# Patient Record
Sex: FEMALE | Employment: FULL TIME | URBAN - METROPOLITAN AREA
[De-identification: names, ages, dates, MRNs, and addresses within clinical notes are randomized per-mention and may not be internally consistent; named-entity substitution may affect disease eponyms.]

---

## 2019-07-24 ENCOUNTER — APPOINTMENT (OUTPATIENT)
Dept: GENERAL RADIOLOGY | Age: 44
End: 2019-07-24
Attending: PHYSICIAN ASSISTANT
Payer: MEDICAID

## 2019-07-24 ENCOUNTER — APPOINTMENT (OUTPATIENT)
Dept: CT IMAGING | Age: 44
End: 2019-07-24
Attending: PHYSICIAN ASSISTANT
Payer: MEDICAID

## 2019-07-24 ENCOUNTER — HOSPITAL ENCOUNTER (EMERGENCY)
Age: 44
Discharge: HOME | End: 2019-07-24
Attending: EMERGENCY MEDICINE | Admitting: EMERGENCY MEDICINE
Payer: MEDICAID

## 2019-07-24 VITALS
DIASTOLIC BLOOD PRESSURE: 78 MMHG | HEART RATE: 88 BPM | BODY MASS INDEX: 39.56 KG/M2 | TEMPERATURE: 98 F | RESPIRATION RATE: 16 BRPM | WEIGHT: 215 LBS | HEIGHT: 62 IN | OXYGEN SATURATION: 100 % | SYSTOLIC BLOOD PRESSURE: 122 MMHG

## 2019-07-24 DIAGNOSIS — I26.99 PE (PULMONARY THROMBOEMBOLISM) (HCC): Primary | ICD-10-CM

## 2019-07-24 LAB
ALBUMIN SERPL-MCNC: 3.8 G/DL (ref 3.4–5)
ALBUMIN/GLOB SERPL: 0.9 {RATIO} (ref 0.8–1.7)
ALP SERPL-CCNC: 103 U/L (ref 45–117)
ALT SERPL-CCNC: 23 U/L (ref 13–56)
ANION GAP SERPL CALC-SCNC: 7 MMOL/L (ref 3–18)
APPEARANCE UR: ABNORMAL
APTT PPP: 29.1 SEC (ref 23–36.4)
AST SERPL-CCNC: 16 U/L (ref 10–38)
ATRIAL RATE: 79 BPM
BACTERIA URNS QL MICRO: ABNORMAL /HPF
BASOPHILS # BLD: 0 K/UL (ref 0–0.1)
BASOPHILS NFR BLD: 0 % (ref 0–2)
BILIRUB SERPL-MCNC: 0.7 MG/DL (ref 0.2–1)
BILIRUB UR QL: NEGATIVE
BUN SERPL-MCNC: 7 MG/DL (ref 7–18)
BUN/CREAT SERPL: 9 (ref 12–20)
CALCIUM SERPL-MCNC: 8.9 MG/DL (ref 8.5–10.1)
CALCULATED P AXIS, ECG09: 49 DEGREES
CALCULATED T AXIS, ECG11: 8 DEGREES
CHLORIDE SERPL-SCNC: 101 MMOL/L (ref 100–111)
CO2 SERPL-SCNC: 27 MMOL/L (ref 21–32)
COLOR UR: ABNORMAL
CREAT SERPL-MCNC: 0.8 MG/DL (ref 0.6–1.3)
D DIMER PPP FEU-MCNC: 2.41 UG/ML(FEU)
DIAGNOSIS, 93000: NORMAL
DIFFERENTIAL METHOD BLD: ABNORMAL
EOSINOPHIL # BLD: 0.1 K/UL (ref 0–0.4)
EOSINOPHIL NFR BLD: 1 % (ref 0–5)
EPITH CASTS URNS QL MICRO: ABNORMAL /LPF (ref 0–5)
ERYTHROCYTE [DISTWIDTH] IN BLOOD BY AUTOMATED COUNT: 16.5 % (ref 11.6–14.5)
GLOBULIN SER CALC-MCNC: 4.4 G/DL (ref 2–4)
GLUCOSE SERPL-MCNC: 126 MG/DL (ref 74–99)
GLUCOSE UR STRIP.AUTO-MCNC: NEGATIVE MG/DL
HCG SERPL QL: NEGATIVE
HCG UR QL: NEGATIVE
HCT VFR BLD AUTO: 40.9 % (ref 35–45)
HGB BLD-MCNC: 13.2 G/DL (ref 12–16)
HGB UR QL STRIP: NEGATIVE
INR PPP: 1.1 (ref 0.8–1.2)
KETONES UR QL STRIP.AUTO: ABNORMAL MG/DL
LACTATE BLD-SCNC: 1.07 MMOL/L (ref 0.4–2)
LEUKOCYTE ESTERASE UR QL STRIP.AUTO: NEGATIVE
LIPASE SERPL-CCNC: 84 U/L (ref 73–393)
LYMPHOCYTES # BLD: 3.9 K/UL (ref 0.9–3.6)
LYMPHOCYTES NFR BLD: 29 % (ref 21–52)
MCH RBC QN AUTO: 26.2 PG (ref 24–34)
MCHC RBC AUTO-ENTMCNC: 32.3 G/DL (ref 31–37)
MCV RBC AUTO: 81.2 FL (ref 74–97)
MONOCYTES # BLD: 0.8 K/UL (ref 0.05–1.2)
MONOCYTES NFR BLD: 6 % (ref 3–10)
MUCOUS THREADS URNS QL MICRO: ABNORMAL /LPF
NEUTS SEG # BLD: 8.3 K/UL (ref 1.8–8)
NEUTS SEG NFR BLD: 64 % (ref 40–73)
NITRITE UR QL STRIP.AUTO: NEGATIVE
P-R INTERVAL, ECG05: 168 MS
PH UR STRIP: 5.5 [PH] (ref 5–8)
PLATELET # BLD AUTO: 277 K/UL (ref 135–420)
PMV BLD AUTO: 9.3 FL (ref 9.2–11.8)
POTASSIUM SERPL-SCNC: 4 MMOL/L (ref 3.5–5.5)
PROT SERPL-MCNC: 8.2 G/DL (ref 6.4–8.2)
PROT UR STRIP-MCNC: 30 MG/DL
PROTHROMBIN TIME: 13.7 SEC (ref 11.5–15.2)
Q-T INTERVAL, ECG07: 372 MS
QRS DURATION, ECG06: 78 MS
QTC CALCULATION (BEZET), ECG08: 426 MS
RBC # BLD AUTO: 5.04 M/UL (ref 4.2–5.3)
RBC #/AREA URNS HPF: ABNORMAL /HPF (ref 0–5)
SODIUM SERPL-SCNC: 135 MMOL/L (ref 136–145)
SP GR UR REFRACTOMETRY: 1.03 (ref 1–1.03)
TROPONIN I SERPL-MCNC: <0.02 NG/ML (ref 0–0.04)
UROBILINOGEN UR QL STRIP.AUTO: 1 EU/DL (ref 0.2–1)
VENTRICULAR RATE, ECG03: 79 BPM
WBC # BLD AUTO: 13.2 K/UL (ref 4.6–13.2)
WBC URNS QL MICRO: ABNORMAL /HPF (ref 0–4)

## 2019-07-24 PROCEDURE — 71046 X-RAY EXAM CHEST 2 VIEWS: CPT

## 2019-07-24 PROCEDURE — 83605 ASSAY OF LACTIC ACID: CPT

## 2019-07-24 PROCEDURE — 87040 BLOOD CULTURE FOR BACTERIA: CPT

## 2019-07-24 PROCEDURE — 96375 TX/PRO/DX INJ NEW DRUG ADDON: CPT

## 2019-07-24 PROCEDURE — 83690 ASSAY OF LIPASE: CPT

## 2019-07-24 PROCEDURE — 81025 URINE PREGNANCY TEST: CPT

## 2019-07-24 PROCEDURE — 74011250636 HC RX REV CODE- 250/636: Performed by: PHYSICIAN ASSISTANT

## 2019-07-24 PROCEDURE — 96374 THER/PROPH/DIAG INJ IV PUSH: CPT

## 2019-07-24 PROCEDURE — 71045 X-RAY EXAM CHEST 1 VIEW: CPT

## 2019-07-24 PROCEDURE — 93005 ELECTROCARDIOGRAM TRACING: CPT

## 2019-07-24 PROCEDURE — 85730 THROMBOPLASTIN TIME PARTIAL: CPT

## 2019-07-24 PROCEDURE — 85025 COMPLETE CBC W/AUTO DIFF WBC: CPT

## 2019-07-24 PROCEDURE — 71275 CT ANGIOGRAPHY CHEST: CPT

## 2019-07-24 PROCEDURE — 84703 CHORIONIC GONADOTROPIN ASSAY: CPT

## 2019-07-24 PROCEDURE — 74011636320 HC RX REV CODE- 636/320: Performed by: EMERGENCY MEDICINE

## 2019-07-24 PROCEDURE — 84484 ASSAY OF TROPONIN QUANT: CPT

## 2019-07-24 PROCEDURE — 85379 FIBRIN DEGRADATION QUANT: CPT

## 2019-07-24 PROCEDURE — 81001 URINALYSIS AUTO W/SCOPE: CPT

## 2019-07-24 PROCEDURE — 99285 EMERGENCY DEPT VISIT HI MDM: CPT

## 2019-07-24 PROCEDURE — 74176 CT ABD & PELVIS W/O CONTRAST: CPT

## 2019-07-24 PROCEDURE — 80053 COMPREHEN METABOLIC PANEL: CPT

## 2019-07-24 PROCEDURE — 85610 PROTHROMBIN TIME: CPT

## 2019-07-24 PROCEDURE — 96361 HYDRATE IV INFUSION ADD-ON: CPT

## 2019-07-24 RX ORDER — KETOROLAC TROMETHAMINE 30 MG/ML
15 INJECTION, SOLUTION INTRAMUSCULAR; INTRAVENOUS
Status: COMPLETED | OUTPATIENT
Start: 2019-07-24 | End: 2019-07-24

## 2019-07-24 RX ORDER — HYDROCODONE BITARTRATE AND ACETAMINOPHEN 5; 325 MG/1; MG/1
1 TABLET ORAL
Qty: 18 TAB | Refills: 0 | Status: SHIPPED | OUTPATIENT
Start: 2019-07-24 | End: 2019-07-27

## 2019-07-24 RX ORDER — AZITHROMYCIN 250 MG/1
TABLET, FILM COATED ORAL
Qty: 6 TAB | Refills: 0 | Status: SHIPPED | OUTPATIENT
Start: 2019-07-24 | End: 2019-07-29

## 2019-07-24 RX ORDER — HEPARIN SODIUM 10000 [USP'U]/100ML
20-36 INJECTION, SOLUTION INTRAVENOUS
Status: DISCONTINUED | OUTPATIENT
Start: 2019-07-24 | End: 2019-07-24

## 2019-07-24 RX ORDER — ONDANSETRON 2 MG/ML
4 INJECTION INTRAMUSCULAR; INTRAVENOUS
Status: COMPLETED | OUTPATIENT
Start: 2019-07-24 | End: 2019-07-24

## 2019-07-24 RX ORDER — MORPHINE SULFATE 4 MG/ML
4 INJECTION INTRAVENOUS
Status: COMPLETED | OUTPATIENT
Start: 2019-07-24 | End: 2019-07-24

## 2019-07-24 RX ORDER — SODIUM CHLORIDE 0.9 % (FLUSH) 0.9 %
5-10 SYRINGE (ML) INJECTION AS NEEDED
Status: DISCONTINUED | OUTPATIENT
Start: 2019-07-24 | End: 2019-07-24 | Stop reason: HOSPADM

## 2019-07-24 RX ORDER — HEPARIN SODIUM 1000 [USP'U]/ML
80 INJECTION, SOLUTION INTRAVENOUS; SUBCUTANEOUS ONCE
Status: DISCONTINUED | OUTPATIENT
Start: 2019-07-24 | End: 2019-07-24

## 2019-07-24 RX ADMIN — ONDANSETRON 4 MG: 2 INJECTION INTRAMUSCULAR; INTRAVENOUS at 09:30

## 2019-07-24 RX ADMIN — SODIUM CHLORIDE 925 ML: 900 INJECTION, SOLUTION INTRAVENOUS at 12:55

## 2019-07-24 RX ADMIN — IOPAMIDOL 80 ML: 612 INJECTION, SOLUTION INTRAVENOUS at 13:05

## 2019-07-24 RX ADMIN — SODIUM CHLORIDE 1000 ML: 900 INJECTION, SOLUTION INTRAVENOUS at 11:55

## 2019-07-24 RX ADMIN — KETOROLAC TROMETHAMINE 15 MG: 30 INJECTION, SOLUTION INTRAMUSCULAR at 09:31

## 2019-07-24 RX ADMIN — MORPHINE SULFATE 4 MG: 4 INJECTION INTRAVENOUS at 09:31

## 2019-07-24 NOTE — ED PROVIDER NOTES
EMERGENCY DEPARTMENT HISTORY AND PHYSICAL EXAM    Date: 7/24/2019  Patient Name: Yara Bloom    History of Presenting Illness     Chief Complaint   Patient presents with    Flank Pain         History Provided By: Patient     Chief Complaint: flank pain, hematuria, one episode of blood in the sputum  Duration: 2 days  Timing: Worsening   Location: Right flank  Quality: Cramping  Severity: Severe  Modifying Factors: None  Associated Symptoms: none       Additional History (Context): Yara Bloom is a 41 y.o. female with no medical history who presents today for 2-day history worsening right flank pain. Patient reports it came on gradually and has worsened. Denies history of kidney stones or this in the past.  Reports hematuria. Denies any pelvic pain, vaginal bleeding or discharge. Patient reports she went to urgent care and was told she had blood in her urine. Patient reports she was sitting in the car this morning trying to go to work when she coughed and had a small amount of blood in her sputum. Patient denies being ill prior to this. Denies any fevers. Denies any nausea or vomiting. Denies any constipation or diarrhea. Denies any abdominal surgical history. Denies taking any medicines daily.       PCP: None    Current Facility-Administered Medications   Medication Dose Route Frequency Provider Last Rate Last Dose    sodium chloride (NS) flush 5-10 mL  5-10 mL IntraVENous PRN Rosalie Bowden        heparin (porcine) 1,000 unit/mL injection 7,800 Units  80 Units/kg IntraVENous ONCE [facility] Eric[facility] Feroz Mcconnell        heparin 25,000 units in D5W 250 ml infusion  20-36 Units/kg/hr IntraVENous TITRATE Rosalie Bowden         Current Outpatient Medications   Medication Sig Dispense Refill    apixaban (ELIQUIS) 5 mg (74 tabs) starter pack Take 10 mg (two 5 mg tablets) by mouth twice a day for 7 days   Followed by 5 mg (one 5 mg tablet) by mouth twice a day 1 Dose Pack 0    azithromycin (ZITHROMAX Z-ALYSON) 250 mg tablet Take two tablets on day one and one tablet on days 2-5 6 Tab 0    HYDROcodone-acetaminophen (NORCO) 5-325 mg per tablet Take 1 Tab by mouth every four (4) hours as needed for Pain. 12 Tab 0       Past History     Past Medical History:  History reviewed. No pertinent past medical history. Past Surgical History:  Past Surgical History:   Procedure Laterality Date    HX GYN  tubal ligation       Family History:  History reviewed. No pertinent family history. Social History:  Social History     Tobacco Use    Smoking status: Current Every Day Smoker     Packs/day: 0.25   Substance Use Topics    Alcohol use: No    Drug use: Not on file       Allergies: Allergies   Allergen Reactions    Penicillins Rash         Review of Systems   Review of Systems   Constitutional: Positive for chills. Negative for fever. HENT: Negative for congestion, rhinorrhea and sore throat. Respiratory: Negative for cough and shortness of breath. Cardiovascular: Negative for chest pain. Gastrointestinal: Negative for abdominal pain, blood in stool, constipation, diarrhea, nausea and vomiting. Genitourinary: Positive for flank pain and hematuria. Negative for dysuria, frequency, pelvic pain and vaginal discharge. Musculoskeletal: Negative for back pain and myalgias. Skin: Negative for rash and wound. Neurological: Negative for dizziness and headaches. All other systems reviewed and are negative. All Other Systems Negative  Physical Exam     Vitals:    07/24/19 0923   BP: (!) 129/92   Pulse: 99   Resp: 18   Temp: 98.9 °F (37.2 °C)   SpO2: 100%   Weight: 97.5 kg (215 lb)   Height: 5' 2\" (1.575 m)     Physical Exam   Constitutional: She is oriented to person, place, and time. She appears well-developed and well-nourished. She appears distressed. Passing around the room      HENT:   Head: Normocephalic and atraumatic. Eyes: Conjunctivae are normal.   Neck: Normal range of motion. Neck supple. Cardiovascular: Normal rate, regular rhythm and normal heart sounds. Pulmonary/Chest: Effort normal and breath sounds normal. No respiratory distress. She has no decreased breath sounds. She has no wheezes. She has no rhonchi. She has no rales. She exhibits no tenderness. Abdominal: Soft. Bowel sounds are normal. She exhibits no distension. There is no tenderness. There is CVA tenderness. There is no rebound and no guarding. Right CVA tenderness      Musculoskeletal: She exhibits no edema or deformity. Neurological: She is alert and oriented to person, place, and time. She has normal reflexes. Skin: Skin is warm and dry. She is not diaphoretic. Psychiatric: She has a normal mood and affect. Nursing note and vitals reviewed. Diagnostic Study Results     Labs -     Recent Results (from the past 12 hour(s))   CBC WITH AUTOMATED DIFF    Collection Time: 07/24/19  9:20 AM   Result Value Ref Range    WBC 13.2 4.6 - 13.2 K/uL    RBC 5.04 4.20 - 5.30 M/uL    HGB 13.2 12.0 - 16.0 g/dL    HCT 40.9 35.0 - 45.0 %    MCV 81.2 74.0 - 97.0 FL    MCH 26.2 24.0 - 34.0 PG    MCHC 32.3 31.0 - 37.0 g/dL    RDW 16.5 (H) 11.6 - 14.5 %    PLATELET 935 203 - 588 K/uL    MPV 9.3 9.2 - 11.8 FL    NEUTROPHILS 64 40 - 73 %    LYMPHOCYTES 29 21 - 52 %    MONOCYTES 6 3 - 10 %    EOSINOPHILS 1 0 - 5 %    BASOPHILS 0 0 - 2 %    ABS. NEUTROPHILS 8.3 (H) 1.8 - 8.0 K/UL    ABS. LYMPHOCYTES 3.9 (H) 0.9 - 3.6 K/UL    ABS. MONOCYTES 0.8 0.05 - 1.2 K/UL    ABS. EOSINOPHILS 0.1 0.0 - 0.4 K/UL    ABS.  BASOPHILS 0.0 0.0 - 0.1 K/UL    DF AUTOMATED     METABOLIC PANEL, COMPREHENSIVE    Collection Time: 07/24/19  9:20 AM   Result Value Ref Range    Sodium 135 (L) 136 - 145 mmol/L    Potassium 4.0 3.5 - 5.5 mmol/L    Chloride 101 100 - 111 mmol/L    CO2 27 21 - 32 mmol/L    Anion gap 7 3.0 - 18 mmol/L    Glucose 126 (H) 74 - 99 mg/dL    BUN 7 7.0 - 18 MG/DL    Creatinine 0.80 0.6 - 1.3 MG/DL    BUN/Creatinine ratio 9 (L) 12 - 20      GFR est AA >60 >60 ml/min/1.73m2    GFR est non-AA >60 >60 ml/min/1.73m2    Calcium 8.9 8.5 - 10.1 MG/DL    Bilirubin, total 0.7 0.2 - 1.0 MG/DL    ALT (SGPT) 23 13 - 56 U/L    AST (SGOT) 16 10 - 38 U/L    Alk.  phosphatase 103 45 - 117 U/L    Protein, total 8.2 6.4 - 8.2 g/dL    Albumin 3.8 3.4 - 5.0 g/dL    Globulin 4.4 (H) 2.0 - 4.0 g/dL    A-G Ratio 0.9 0.8 - 1.7     LIPASE    Collection Time: 07/24/19  9:20 AM   Result Value Ref Range    Lipase 84 73 - 393 U/L   HCG QL SERUM    Collection Time: 07/24/19  9:20 AM   Result Value Ref Range    HCG, Ql. NEGATIVE  NEG     D DIMER    Collection Time: 07/24/19  9:20 AM   Result Value Ref Range    D DIMER 2.41 (H) <0.46 ug/ml(FEU)   URINALYSIS W/ RFLX MICROSCOPIC    Collection Time: 07/24/19 10:50 AM   Result Value Ref Range    Color DARK YELLOW      Appearance TURBID      Specific gravity 1.028 1.005 - 1.030      pH (UA) 5.5 5.0 - 8.0      Protein 30 (A) NEG mg/dL    Glucose NEGATIVE  NEG mg/dL    Ketone TRACE (A) NEG mg/dL    Bilirubin NEGATIVE  NEG      Blood NEGATIVE  NEG      Urobilinogen 1.0 0.2 - 1.0 EU/dL    Nitrites NEGATIVE  NEG      Leukocyte Esterase NEGATIVE  NEG     HCG URINE, QL    Collection Time: 07/24/19 10:50 AM   Result Value Ref Range    HCG urine, QL NEGATIVE  NEG     URINE MICROSCOPIC ONLY    Collection Time: 07/24/19 10:50 AM   Result Value Ref Range    WBC 0 to 3 0 - 4 /hpf    RBC 2 to 5 0 - 5 /hpf    Epithelial cells 2+ 0 - 5 /lpf    Bacteria 3+ (A) NEG /hpf    Mucus 2+ (A) NEG /lpf   EKG, 12 LEAD, INITIAL    Collection Time: 07/24/19 11:09 AM   Result Value Ref Range    Ventricular Rate 79 BPM    Atrial Rate 79 BPM    P-R Interval 168 ms    QRS Duration 78 ms    Q-T Interval 372 ms    QTC Calculation (Bezet) 426 ms    Calculated P Axis 49 degrees    Calculated T Axis 8 degrees    Diagnosis       Normal sinus rhythm  Normal ECG  No previous ECGs available  Confirmed by Zakiya Berry (Veena) on 7/24/2019 3:44:01 PM     POC LACTIC ACID    Collection Time: 07/24/19 12:17 PM   Result Value Ref Range    Lactic Acid (POC) 1.07 0.40 - 2.00 mmol/L   PROTHROMBIN TIME + INR    Collection Time: 07/24/19  1:30 PM   Result Value Ref Range    Prothrombin time 13.7 11.5 - 15.2 sec    INR 1.1 0.8 - 1.2     PTT    Collection Time: 07/24/19  1:30 PM   Result Value Ref Range    aPTT 29.1 23.0 - 36.4 SEC   TROPONIN I    Collection Time: 07/24/19  2:00 PM   Result Value Ref Range    Troponin-I, QT <0.02 0.0 - 0.045 NG/ML       Radiologic Studies -   CTA CHEST W OR W WO CONT   Final Result   IMPRESSION:      1. Acute segmental pulmonary embolism is present within the right lower lung. Associated area of patchy airspace opacity is present which could be related to   evolving pulmonary infarct or pneumonia. No right heart strain. Critical findings discussed with Dr. Medina See at 13:22 on 7/24/2019      CRITICAL RESULT:      Acute segmental pulmonary embolism is present within the right lower lung. Associated area of patchy airspace opacity is present which could be related to   evolving pulmonary infarct or pneumonia. No right heart strain. XR CHEST PA LAT   Final Result   IMPRESSION:         1. Right basilar opacity in keeping with preceding CT scan findings and small   right pleural effusion. Overall findings suggest pneumonia. Recommend   radiographic follow-up to document expected clinical resolution in 4-6 weeks'   time. CT ABD PELV WO CONT   Final Result   IMPRESSION:      1. Right lower lobe infiltrate with small right pleural effusion. The   appearance suggests underlying pneumonia. 2.  Hepatic steatosis. 3.  Uterine fibroids. XR CHEST PORT   Final Result   IMPRESSION:         1. Low lung volumes with basilar areas of opacity favored to reflect   atelectasis. CT Results  (Last 48 hours)               07/24/19 1304  CTA CHEST W OR W WO CONT Final result    Impression:  IMPRESSION:       1.   Acute segmental pulmonary embolism is present within the right lower lung. Associated area of patchy airspace opacity is present which could be related to   evolving pulmonary infarct or pneumonia. No right heart strain. Critical findings discussed with Dr. Flower Smapson at 13:22 on 7/24/2019       CRITICAL RESULT:       Acute segmental pulmonary embolism is present within the right lower lung. Associated area of patchy airspace opacity is present which could be related to   evolving pulmonary infarct or pneumonia. No right heart strain. Narrative:  EXAM: CTA chest       INDICATION: Hemoptysis. Shortness of breath. Right flank pain. COMPARISON: CT AP 7/24/2019       TECHNIQUE: Axial CT imaging from the thoracic inlet through the diaphragm with   intravenous contrast. Coronal and sagittal MIP reformats were generated. One or   more dose reduction techniques were used on this CT: automated exposure control,   adjustment of the mAs and/or kVp according to patient size, and iterative   reconstruction techniques. The specific techniques used on this CT exam have   been documented in the patient's electronic medical record. Digital Imaging and   Communications in Medicine (DICOM) format image data are available to   nonaffiliated external healthcare facilities or entities on a secured, media   free, reciprocally searchable basis with patient authorization for at least a   12-month period after this study. _______________       FINDINGS:       Overall exam quality is excellent. Pulmonary arterial enhancement is optimal   with optimal breath hold and no significant artifact. PULMONARY ARTERIES: Filling defect is present within a pulmonary artery within   the right lower lung, compatible with acute pulmonary embolism. There is no   additional pulmonary embolism. No right heart strain. MEDIASTINUM: Normal heart size. No evidence of right heart strain. Aorta is   unremarkable. No pericardial effusion.        LUNGS: Focal patchy airspace opacity is present within the right lower lung at   the site of the pulmonary embolism which could be related to evolving pulmonary   infarction or pneumonia. Mild atelectasis is present within the left lower lung. AIRWAY: Normal.       PLEURA: Normal.       LYMPH NODES: No enlarged nodes. UPPER ABDOMEN: Unremarkable. BONES: No acute or aggressive osseous abnormalities identified. OTHER: None.       _______________           07/24/19 1014  CT ABD PELV WO CONT Final result    Impression:  IMPRESSION:       1. Right lower lobe infiltrate with small right pleural effusion. The   appearance suggests underlying pneumonia. 2.  Hepatic steatosis. 3.  Uterine fibroids. Narrative:  EXAM: CT of the abdomen and pelvis without 7/24/2019. INDICATION: 2 day history of right flank pain. Patient also describes blood in   sputum. COMPARISON: None. TECHNIQUE: Axial CT imaging of the abdomen and pelvis was performed without   contrast. Multiplanar reformats were generated. Dose reduction techniques:  Automated exposure control, mAs and/or kVp   reductions based on patient size, and iterative reconstruction. The specific   techniques utilized on this CT exam have been documented in the patient's   electronic medical record. Digital imaging and communications and medicine (DICOM) format image data are   available to nonaffiliated external healthcare facilities or entities on a   secure, media free, reciprocally searchable basis with patient authorization for   at least a 12 month period after this study. _______________       FINDINGS:       LIVER, BILIARY: The liver is diffusely low in attenuation and correlation with   the spleen, likely representing underlying hepatic steatosis. No focal hepatic   abnormalities are identified. Focal sparing is suggested along the gallbladder   fossa. No biliary dilation.        PANCREAS: Grossly unremarkable. SPLEEN: Normal in size and configuration. ADRENALS: Unremarkable. KIDNEYS: No urolithiasis or hydronephrosis. No calculi are identified along the   course of the ureters. LYMPH NODES: WNL. PERITONEAL CAVITY AND GASTROINTESTINAL TRACT: No free air or free fluid. No   bowel dilation or wall thickening. A normal-appearing appendix extends   inferiorly from the cecal tip. VASCULATURE: Unremarkable. LOWER CHEST: Patchy bibasilar atelectasis is noted. Somewhat more confluent   alveolar opacification is noted at the right lung base and underlying pneumonia   cannot be excluded. There is also trace right pleural effusion. PELVIC VISCERA: Fibroids are noted involving the dorsal aspect of the uterine   fundus. No abnormal intrapelvic masses or fluid collections are otherwise   demonstrable. The urinary bladder is contracted but otherwise unremarkable. BONES: No acute or aggressive osseous abnormalities are identified. OTHER: None.   _______________               CXR Results  (Last 48 hours)               07/24/19 1144  XR CHEST PA LAT Final result    Impression:  IMPRESSION:           1. Right basilar opacity in keeping with preceding CT scan findings and small   right pleural effusion. Overall findings suggest pneumonia. Recommend   radiographic follow-up to document expected clinical resolution in 4-6 weeks'   time. Narrative:  EXAM: XR CHEST PA LAT       CLINICAL INDICATION/HISTORY: Cough, blood in sputum   -Additional: None       COMPARISON: July 24, 2019 at 9:48 AM       TECHNIQUE: PA and lateral views of the chest       _______________       FINDINGS:       HEART AND MEDIASTINUM: Stable cardiac size and mediastinal contours. LUNGS AND PLEURAL SPACES: Bibasilar opacities noted, right greater than left   with small right pleural effusion.        BONY THORAX AND SOFT TISSUES: No acute osseous abnormality       _______________ 07/24/19 1002  XR CHEST PORT Final result    Impression:  IMPRESSION:           1. Low lung volumes with basilar areas of opacity favored to reflect   atelectasis. Narrative:  EXAM: XR CHEST PORT       CLINICAL INDICATION/HISTORY: Cough   -Additional: Abdominal pain       COMPARISON: None       TECHNIQUE: Frontal view of the chest       _______________       FINDINGS:       HEART AND MEDIASTINUM: The cardiac size and mediastinal contours are within   normal limits for AP technique. LUNGS AND PLEURAL SPACES: Lung volumes are low with basilar areas of opacity   bilaterally. No pneumothorax or pleural effusion. BONY THORAX AND SOFT TISSUES: No acute osseous abnormality       _______________                   Medical Decision Making   I am the first provider for this patient. I reviewed the vital signs, available nursing notes, past medical history, past surgical history, family history and social history. Vital Signs-Reviewed the patient's vital signs. Records Reviewed: Nursing Notes and Old Medical Records     Procedures: None   Procedures    Provider Notes (Medical Decision Making):     Differential: UTI, pyelonephritis, nephrolithiasis, cholecystitis, cholelithiasis, hepatitis    Plan: Will order pain meds, zofran, fluids, ua, urine preg labs and CT of abd and pelvis     11:02 AM  PERC score 1, will order D-dimer - pain much better controlled at this time. Will repeat chest xray due to poor quality     11:54 AM  D-dimer elevated- will order CT of chest and rule out PE.  WBC over 12 and HR over 90 will start Sepsis bundle. 1:53 PM  Lactic within normal limits. Have discussed findings of PE on CTA. Will discuss case with Hospitalist.  Patient is hemodynamically stable at this time. Patient's O2 sat is 100%. Patient is pain-free and asymptomatic at this time. 3:56 PM  Discussed case with Dr. Zac Wu, hospitalist, who states patient is safe for outpatient oral therapy.   Patient is hemodynamically stable, no signs of heart strain, coags are within normal limits, patient is asymptomatic at this time and well-appearing. Patient also has no comorbidities. Advised Eliquis starter pack. Anne Marie Rodrigues with  has seen patient and assisted her with finding a primary care doctor. We will also discharge home on short course of antibiotics given CT concern for infarct versus pneumonia. Troponin within normal limits, have discussed risks and benefits of anticoagulants with patient. She states understanding. I  have stressed the importance of PCP follow-up. Patient is reliable. Strict return precautions have been discussed at length. Patient agrees with the plan and management and states all questions have been thoroughly answered and there are no more remaining questions. MED RECONCILIATION:  Current Facility-Administered Medications   Medication Dose Route Frequency    sodium chloride (NS) flush 5-10 mL  5-10 mL IntraVENous PRN    heparin (porcine) 1,000 unit/mL injection 7,800 Units  80 Units/kg IntraVENous ONCE    heparin 25,000 units in D5W 250 ml infusion  20-36 Units/kg/hr IntraVENous TITRATE     Current Outpatient Medications   Medication Sig    apixaban (ELIQUIS) 5 mg (74 tabs) starter pack Take 10 mg (two 5 mg tablets) by mouth twice a day for 7 days   Followed by 5 mg (one 5 mg tablet) by mouth twice a day    azithromycin (ZITHROMAX Z-ALYSON) 250 mg tablet Take two tablets on day one and one tablet on days 2-5    HYDROcodone-acetaminophen (NORCO) 5-325 mg per tablet Take 1 Tab by mouth every four (4) hours as needed for Pain. Disposition:  Home     DISCHARGE NOTE:   Pt has been reexamined. Patient has no new complaints, changes, or physical findings. Care plan outlined and precautions discussed. Results of workup were reviewed with the patient. All medications were reviewed with the patient. All of pt's questions and concerns were addressed.  Patient was instructed and agrees to follow up with PCP as well as to return to the ED upon further deterioration. Patient is ready to go home. Follow-up Information     Follow up With Specialties Details Why Contact Info    [facility] EMERGENCY DEPT Emergency Medicine  As needed [address] [place] [address] [facility]    [id number] [address] [place]  [phone]       Follow-up with PCP in 1 week            Current Discharge Medication List      START taking these medications    Details   apixaban (ELIQUIS) 5 mg (74 tabs) starter pack Take 10 mg (two 5 mg tablets) by mouth twice a day for 7 days   Followed by 5 mg (one 5 mg tablet) by mouth twice a day  Qty: 1 Dose Pack, Refills: 0      azithromycin (ZITHROMAX Z-ALYSON) 250 mg tablet Take two tablets on day one and one tablet on days 2-5  Qty: 6 Tab, Refills: 0                 Diagnosis     Clinical Impression:   1.  PE (pulmonary thromboembolism) ([address]

## 2019-07-24 NOTE — ED NOTES
NATACHA Jones states patient will not receive any heparin and is awaiting troponin results at this time and plan is to discharge after.

## 2019-07-24 NOTE — LETTER
[facility] EMERGENCY DEPT 
[address] 
[address] [facility] [address] [phone] Work/School Note Date: 7/24/2019 To Whom It May concern: 
 
Nick Gregory was seen and treated today in the emergency room by the following provider(s): 
Attending Provider: Ashley Mckeon DO Physician Assistant: NATACHA Santos. Nick Gregory may return to work on 7/26/19 Sincerely, Juliana Gerard [place]

## 2019-07-24 NOTE — ED NOTES
Discharge instructions given to patient by NATACHA Jones, patient verbalizes understanding of instructions. Patient alert and oriented x3, denies pain or shortness of breath at this time. Patient ambulatory, gait steady. Patient armband removed and given to patient to take home.   Patient was informed of the privacy risks if armband lost or stolen

## 2019-07-24 NOTE — PROGRESS NOTES
Referral received to assist with PCP follow up. Met with pt and agreed for me to assist. Appointment scheduled with Dr Miroslava Mensah tomorrow 7/25/19 at [address] Information given to pt and ED PA made aware.

## 2019-07-24 NOTE — ED TRIAGE NOTES
Pt presents with R sided flank pain x 2days. Seen at urgent care. States one episode of blood in sputum as well.  No h/o kidney stones

## 2019-07-24 NOTE — DISCHARGE INSTRUCTIONS
Patient Education        Pulmonary Embolism: Care Instructions  Your Care Instructions    Pulmonary embolism is the sudden blockage of an artery in the lung. Blood clots in the deep veins of the leg or pelvis (deep vein thrombosis, or DVT) are the most common cause. These blood clots can travel to the lungs. Pulmonary embolism can be very serious. Because you have had one pulmonary embolism, you are at greater risk for having another one. But you can take steps to prevent another pulmonary embolism by following your doctor's instructions. You will probably take a prescription blood-thinning medicine to prevent blood clots. A blood thinner can stop a blood clot from growing larger and prevent new clots from forming. Follow-up care is a key part of your treatment and safety. Be sure to make and go to all appointments, and call your doctor if you are having problems. It's also a good idea to know your test results and keep a list of the medicines you take. How can you care for yourself at home? · Take your medicines exactly as prescribed. Call your doctor if you think you are having a problem with your medicine. You will get more details on the specific medicines your doctor prescribes. · If you are taking a blood thinner, be sure you get instructions about how to take your medicine safely. Blood thinners can cause serious bleeding problems. Preventing future pulmonary embolisms  · Exercise. Keep blood moving in your legs to keep clots from forming. If you are traveling by car, stop every hour or so. Get out and walk around for a few minutes. If you are traveling by bus, train, or plane, get out of your seat and walk up and down the aisles every hour or so. You also can do leg exercises while you are seated. Pump your feet up and down by pulling your toes up toward your knees then pointing them down. · Get up out of bed as soon as possible after an illness or surgery. · Do not smoke.  If you need help quitting, talk to your doctor about stop-smoking programs and medicines. These can increase your chances of quitting for good. · Check with your doctor before taking hormone or birth control pills. These may increase your risk of blot clots. · Ask your doctor about wearing compression stockings to help prevent blood clots in your legs. There are different types of stockings, and they need to fit right. So your doctor will recommend what you need. When should you call for help? Call 911 anytime you think you may need emergency care. For example, call if:    · You have shortness of breath.     · You have chest pain.     · You passed out (lost consciousness).     · You cough up blood.    Call your doctor now or seek immediate medical care if:    · You have new or worsening pain or swelling in your leg.    Watch closely for changes in your health, and be sure to contact your doctor if:    · You do not get better as expected. Where can you learn more? Go to http://anthony-doe.info/. Enter B371 in the search box to learn more about \"Pulmonary Embolism: Care Instructions. \"  Current as of: September 26, 2018  Content Version: 12.1  © 8575-6909 Healthwise, Incorporated. Care instructions adapted under license by [employer] (which disclaims liability or warranty for this information). If you have questions about a medical condition or this instruction, always ask your healthcare professional. [address] any warranty or liability for your use of this information.

## 2019-07-24 NOTE — ED NOTES
PA notified this nurse that patient will not receive Heparin drip nor in patient admission. Pending PT, PTT, and Troponin, patient will be managed out patient on Eliquis per consult with Dr. Osiris Lawson.

## 2019-07-24 NOTE — PROGRESS NOTES
Asked to evaluate acute PE case. Pt presented with pain alone w/ hemoptysis. CTA showed peripheral PE w/o evidence heart strain. Hemodynamically stable without any symptoms of dyspnea. She has no comorbidities. Her PESI score is 41 which is Class I, Very Low Risk: 0-1.6% 30-day mortality in this group. This is group is appropriate for outpatient management. She needs established PCP to follow up with. Can use NoAC starter pack. Patient is in appropriate risk group appropriate for discharge home.     Jamie Becerra DO  Internal Medicine, Hospitalist  Pager: [phone] [facility]

## 2019-07-25 ENCOUNTER — OFFICE VISIT (OUTPATIENT)
Dept: INTERNAL MEDICINE CLINIC | Age: 44
End: 2019-07-25

## 2019-07-25 VITALS
WEIGHT: 212.8 LBS | HEART RATE: 85 BPM | RESPIRATION RATE: 18 BRPM | HEIGHT: 62 IN | BODY MASS INDEX: 39.16 KG/M2 | SYSTOLIC BLOOD PRESSURE: 107 MMHG | OXYGEN SATURATION: 96 % | TEMPERATURE: 98 F | DIASTOLIC BLOOD PRESSURE: 75 MMHG

## 2019-07-25 DIAGNOSIS — I26.99 OTHER ACUTE PULMONARY EMBOLISM WITHOUT ACUTE COR PULMONALE (HCC): Primary | ICD-10-CM

## 2019-07-25 NOTE — PATIENT INSTRUCTIONS
Pulmonary Embolism: Care Instructions  Your Care Instructions    Pulmonary embolism is the sudden blockage of an artery in the lung. Blood clots in the deep veins of the leg or pelvis (deep vein thrombosis, or DVT) are the most common cause. These blood clots can travel to the lungs. Pulmonary embolism can be very serious. Because you have had one pulmonary embolism, you are at greater risk for having another one. But you can take steps to prevent another pulmonary embolism by following your doctor's instructions. You will probably take a prescription blood-thinning medicine to prevent blood clots. A blood thinner can stop a blood clot from growing larger and prevent new clots from forming. Follow-up care is a key part of your treatment and safety. Be sure to make and go to all appointments, and call your doctor if you are having problems. It's also a good idea to know your test results and keep a list of the medicines you take. How can you care for yourself at home? · Take your medicines exactly as prescribed. Call your doctor if you think you are having a problem with your medicine. You will get more details on the specific medicines your doctor prescribes. · If you are taking a blood thinner, be sure you get instructions about how to take your medicine safely. Blood thinners can cause serious bleeding problems. Preventing future pulmonary embolisms  · Exercise. Keep blood moving in your legs to keep clots from forming. If you are traveling by car, stop every hour or so. Get out and walk around for a few minutes. If you are traveling by bus, train, or plane, get out of your seat and walk up and down the aisles every hour or so. You also can do leg exercises while you are seated. Pump your feet up and down by pulling your toes up toward your knees then pointing them down. · Get up out of bed as soon as possible after an illness or surgery. · Do not smoke.  If you need help quitting, talk to your doctor about stop-smoking programs and medicines. These can increase your chances of quitting for good. · Check with your doctor before taking hormone or birth control pills. These may increase your risk of blot clots. · Ask your doctor about wearing compression stockings to help prevent blood clots in your legs. There are different types of stockings, and they need to fit right. So your doctor will recommend what you need. When should you call for help? Call 911 anytime you think you may need emergency care. For example, call if:    · You have shortness of breath.     · You have chest pain.     · You passed out (lost consciousness).     · You cough up blood.    Call your doctor now or seek immediate medical care if:    · You have new or worsening pain or swelling in your leg.    Watch closely for changes in your health, and be sure to contact your doctor if:    · You do not get better as expected. Where can you learn more? Go to http://anthony-doe.info/. Enter D034 in the search box to learn more about \"Pulmonary Embolism: Care Instructions. \"  Current as of: September 26, 2018  Content Version: 12.1  © 5556-0383 Healthwise, Incorporated. Care instructions adapted under license by [employer] (which disclaims liability or warranty for this information). If you have questions about a medical condition or this instruction, always ask your healthcare professional. [address] any warranty or liability for your use of this information.

## 2019-07-25 NOTE — PROGRESS NOTES
HISTORY OF PRESENT ILLNESS  Homer Presley is a 41 y.o. female. Pt scheduled to establish care after ED yesterday due to SOB, hemoptysis but understands that I am leaving practice. Felt well until two days ago when she had some right side back pain. Felt a little lightheaded later that day. That night had severe pain R shoulder to back. Sitting up helped relieve. Describes as stabbng feeling  Yesterday coughed and had blood in sputum. Went to urgent care. At that time had worsening of stabbing and SOB. Thought there that she could be having kidney stone (some blood in urine) and se was referred to ED. Had CT which showed PE. No LE US done but she denies leg swelling or pain. No recent travel, prolonged immobility. Denies FH of blood clots or prior personal hx. She was started on Eliquis. At this time, pain is better (did take prn Norco). Denies SOB, palpitations, lightheadedness. Hospital Follow Up   Pertinent negatives include no chest pain, no headaches and no shortness of breath. Review of Systems   Constitutional: Negative for chills, fever and weight loss. HENT: Negative for congestion and ear pain. Eyes: Negative for blurred vision and pain. Respiratory: Positive for cough and hemoptysis (resolved). Negative for shortness of breath. Cardiovascular: Negative for chest pain, palpitations and leg swelling. Gastrointestinal: Negative for nausea and vomiting. Genitourinary: Negative for frequency and urgency. Musculoskeletal: Positive for back pain. Negative for joint pain and myalgias. Skin: Negative for itching and rash. Neurological: Negative for dizziness, tingling and headaches. Psychiatric/Behavioral: Negative for depression. The patient is not nervous/anxious.       Past Medical History:   Diagnosis Date    Asthma     Thromboembolus [facility])      Past Surgical History:   Procedure Laterality Date    HX GYN  tubal ligation    HX TUBAL LIGATION  2007     Current Outpatient Medications on File Prior to Visit   Medication Sig Dispense Refill    apixaban (ELIQUIS) 5 mg (74 tabs) starter pack Take 10 mg (two 5 mg tablets) by mouth twice a day for 7 days   Followed by 5 mg (one 5 mg tablet) by mouth twice a day 1 Dose Pack 0    azithromycin (ZITHROMAX Z-ALYSON) 250 mg tablet Take two tablets on day one and one tablet on days 2-5 6 Tab 0    HYDROcodone-acetaminophen (NORCO) 5-325 mg per tablet Take 1 Tab by mouth every four (4) hours as needed for Pain. 12 Tab 0    HYDROcodone-acetaminophen (NORCO) 5-325 mg per tablet Take 1 Tab by mouth every four (4) hours as needed for Pain for up to 3 days. Max Daily Amount: 6 Tabs.  18 Tab 0     Current Facility-Administered Medications on File Prior to Visit   Medication Dose Route Frequency Provider Last Rate Last Dose    [COMPLETED] sodium chloride 0.9 % bolus infusion 1,000 mL  1,000 mL IntraVENous ONCE Moises Jones [place]   Stopped at 07/24/19 1400    Followed by   [facility] [COMPLETED] sodium chloride 0.9 % bolus infusion 925 mL  925 mL IntraVENous Moises Fu [place]   Stopped at 07/24/19 1400    [COMPLETED] iopamidol (ISOVUE 300) 61 % contrast injection 90 mL  90 mL IntraVENous RAD ONCE Opal Stover, DO   80 mL at 07/24/19 1305    [DISCONTINUED] sodium chloride (NS) flush 5-10 mL  5-10 mL IntraVENous PRN Ciro Martinez        [DISCONTINUED] heparin (porcine) 1,000 unit/mL injection 7,800 Units  80 Units/kg IntraVENous ONCE Moises Jones [place]        [DISCONTINUED] heparin 25,000 units in D5W 250 ml infusion  20-36 Units/kg/hr IntraVENous TITRATE Moises Jones [place]         Allergies   Allergen Reactions    Penicillins Rash     Social History     Tobacco Use    Smoking status: Former Smoker     Packs/day: 0.25    Smokeless tobacco: Never Used   Substance Use Topics    Alcohol use: No    Drug use: Never     Family History   Problem Relation Age of Onset    Diabetes Mother     Hypertension Mother      Physical Exam Constitutional: She appears well-developed and well-nourished. No distress. /75 (BP 1 Location: Left arm, BP Patient Position: Sitting)   Pulse 85   Temp 98 °F (36.7 °C) (Oral)   Resp 18   Ht 5' 2\" (1.575 m)   Wt 212 lb 12.8 oz (96.5 kg)   LMP 07/15/2019   SpO2 96%   BMI 38.92 kg/m²      Eyes: EOM are normal. Right eye exhibits no discharge. Left eye exhibits no discharge. No scleral icterus. Neck: Neck supple. Cardiovascular: Normal rate, regular rhythm and normal heart sounds. Exam reveals no gallop and no friction rub. No murmur heard. Pulmonary/Chest: Effort normal and breath sounds normal. No respiratory distress. She has no wheezes. She has no rales. Abdominal: Soft. She exhibits no distension. There is no tenderness. Musculoskeletal: She exhibits no edema or tenderness. Lymphadenopathy:     She has no cervical adenopathy. Neurological: She is alert. She exhibits normal muscle tone. Skin: Skin is warm and dry. Psychiatric: She has a normal mood and affect. Lab Results   Component Value Date/Time    Sodium 135 (L) 07/24/2019 09:20 AM    Potassium 4.0 07/24/2019 09:20 AM    Chloride 101 07/24/2019 09:20 AM    CO2 27 07/24/2019 09:20 AM    Anion gap 7 07/24/2019 09:20 AM    Glucose 126 (H) 07/24/2019 09:20 AM    BUN 7 07/24/2019 09:20 AM    Creatinine 0.80 07/24/2019 09:20 AM    BUN/Creatinine ratio 9 (L) 07/24/2019 09:20 AM    GFR est AA >60 07/24/2019 09:20 AM    GFR est non-AA >60 07/24/2019 09:20 AM    Calcium 8.9 07/24/2019 09:20 AM    Bilirubin, total 0.7 07/24/2019 09:20 AM    AST (SGOT) 16 07/24/2019 09:20 AM    Alk.  phosphatase 103 07/24/2019 09:20 AM    Protein, total 8.2 07/24/2019 09:20 AM    Albumin 3.8 07/24/2019 09:20 AM    Globulin 4.4 (H) 07/24/2019 09:20 AM    A-G Ratio 0.9 07/24/2019 09:20 AM    ALT (SGPT) 23 07/24/2019 09:20 AM     Lab Results   Component Value Date/Time    WBC 13.2 07/24/2019 09:20 AM    HGB 13.2 07/24/2019 09:20 AM    HCT 40.9 07/24/2019 09:20 AM    PLATELET 922 92/43/9991 09:20 AM    MCV 81.2 07/24/2019 09:20 AM     CT Results (most recent):  Results from Hospital Encounter encounter on 07/24/19   CTA CHEST W OR W WO CONT    Narrative EXAM: CTA chest    INDICATION: Hemoptysis. Shortness of breath. Right flank pain. COMPARISON: CT AP 7/24/2019    TECHNIQUE: Axial CT imaging from the thoracic inlet through the diaphragm with  intravenous contrast. Coronal and sagittal MIP reformats were generated. One or  more dose reduction techniques were used on this CT: automated exposure control,  adjustment of the mAs and/or kVp according to patient size, and iterative  reconstruction techniques. The specific techniques used on this CT exam have  been documented in the patient's electronic medical record. Digital Imaging and  Communications in Medicine (DICOM) format image data are available to  nonaffiliated external healthcare facilities or entities on a secured, media  free, reciprocally searchable basis with patient authorization for at least a  12-month period after this study. _______________    FINDINGS:    Overall exam quality is excellent. Pulmonary arterial enhancement is optimal  with optimal breath hold and no significant artifact. PULMONARY ARTERIES: Filling defect is present within a pulmonary artery within  the right lower lung, compatible with acute pulmonary embolism. There is no  additional pulmonary embolism. No right heart strain. MEDIASTINUM: Normal heart size. No evidence of right heart strain. Aorta is  unremarkable. No pericardial effusion. LUNGS: Focal patchy airspace opacity is present within the right lower lung at  the site of the pulmonary embolism which could be related to evolving pulmonary  infarction or pneumonia. Mild atelectasis is present within the left lower lung. AIRWAY: Normal.    PLEURA: Normal.    LYMPH NODES: No enlarged nodes. UPPER ABDOMEN: Unremarkable.     BONES: No acute or aggressive osseous abnormalities identified. OTHER: None.    _______________      Impression IMPRESSION:    1. Acute segmental pulmonary embolism is present within the right lower lung. Associated area of patchy airspace opacity is present which could be related to  evolving pulmonary infarct or pneumonia. No right heart strain. Critical findings discussed with Dr. Nohemi Bender at 13:22 on 7/24/2019    CRITICAL RESULT:    Acute segmental pulmonary embolism is present within the right lower lung. Associated area of patchy airspace opacity is present which could be related to  evolving pulmonary infarct or pneumonia. No right heart strain. ASSESSMENT and PLAN    ICD-10-CM ICD-9-CM    1. Other acute pulmonary embolism without acute cor pulmonale (HCC) I26.99 415.19 REFERRAL TO HEMATOLOGY     Remains hemodynamically stable. Denies SOB. Continue with Eliquis. Referral entered to hematology given lack of inciting factors. I am leaving practice, but discussed importance of establishing PCP f/u. Asked that she return in one week.

## 2019-07-25 NOTE — LETTER
NOTIFICATION RETURN TO WORK / SCHOOL 
 
7/25/2019 10:33 AM 
 
Ms. Macarena Wise [address] 
[place] [address] To Whom It May Concern: 
 
Macarena Wise is currently under the care of Marcia Mcconnell. She will return to work/school on: July 25, 2019. She was recently diagnosed with a pulmonary embolism (blood clot to the lung) which will require close follow up. If there are questions or concerns please have the patient contact our office. Sincerely, Wes Pallas, MD

## 2019-07-30 LAB
BACTERIA SPEC CULT: NORMAL
BACTERIA SPEC CULT: NORMAL
SERVICE CMNT-IMP: NORMAL
SERVICE CMNT-IMP: NORMAL